# Patient Record
Sex: MALE | Race: WHITE | NOT HISPANIC OR LATINO | URBAN - METROPOLITAN AREA
[De-identification: names, ages, dates, MRNs, and addresses within clinical notes are randomized per-mention and may not be internally consistent; named-entity substitution may affect disease eponyms.]

---

## 2022-02-03 ENCOUNTER — EMERGENCY (EMERGENCY)
Facility: HOSPITAL | Age: 72
LOS: 1 days | Discharge: HOME | End: 2022-02-03
Admitting: EMERGENCY MEDICINE
Payer: MEDICARE

## 2022-02-03 VITALS
WEIGHT: 139.99 LBS | RESPIRATION RATE: 18 BRPM | TEMPERATURE: 98 F | OXYGEN SATURATION: 96 % | HEIGHT: 72 IN | SYSTOLIC BLOOD PRESSURE: 167 MMHG | HEART RATE: 92 BPM | DIASTOLIC BLOOD PRESSURE: 96 MMHG

## 2022-02-03 DIAGNOSIS — S90.425A BLISTER (NONTHERMAL), LEFT LESSER TOE(S), INITIAL ENCOUNTER: ICD-10-CM

## 2022-02-03 DIAGNOSIS — M79.675 PAIN IN LEFT TOE(S): ICD-10-CM
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
## 2022-02-03 DIAGNOSIS — M79.672 PAIN IN LEFT FOOT: ICD-10-CM
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
## 2022-02-03 PROCEDURE — 99282 EMERGENCY DEPT VISIT SF MDM: CPT

## 2022-02-03 NOTE — ED ADULT NURSE NOTE - OBJECTIVE STATEMENT
Pt presented to er with c/o b/l toes mild redness that started 1st week of January. Pt also endorses Rt 5th toe blister. Pt states saw a dermatologist on Tuesday, was prescribed a anti-fungal cream, have not used it yet. Here for second opinion. Of note, pt states that he wears thicker socks and walks 3 miles a day. Pt denies pain, swelling, no drainage. Pt denies any medical history. Pt A&Ox4, ambulatory with steady gait, speaking in clear/full sentences, no acute distress, vital signs stable.

## 2022-02-03 NOTE — ED PROVIDER NOTE - NSFOLLOWUPINSTRUCTIONS_ED_ALL_ED_FT
Please apply neosporin or bacitracin to small foot blister      Use foot cream as reccomended by derm    follow up with podiatry within two weeks    Return to the ed for any worsening or alamring symptoms                Nail Fungus    WHAT YOU NEED TO KNOW:    What is nail fungus? Nail fungus, or onychomycosis, is a fungal infection in your toenail or fingernail. Nail fungus is more common in toenails than fingernails. The cause of the infection may not be known.    What increases my risk for nail fungus?   •Athlete's foot      •Age 60 years or older      •Conditions such as diabetes, circulation problems, or a weak immune system      •Wearing heavy work boots that make your feet warm and sweaty      •Walking barefoot in locker rooms or public showers      •Working in a job where your hands are often wet (dishwashers, house )      •An unhealthy nail or underlying nail deformity      What are the signs and symptoms of nail fungus?   •Nails that curl up or down or are misshapen      •Discolored (often white, yellow, or brown) nails      •Fragile or cracked nails      •Thick nails or nails with rough, jagged edges      •Nail that is  from the nail bed      •Tenderness or pain in the affected nail      How is nail fungus diagnosed? Your healthcare provider can usually diagnose nail fungus by examining your nails. A small nail clipping may be examined or sent to a lab to test for infection.    How is nail fungus treated? Antifungal medicine is a pill that treats a fungal infection. You may need to take this medicine for up to 12 weeks. Topical treatments include creams and polishes that you apply to the top of your nail. You may need to use topical treatments for up to 1 year before you see positive results. Ask your healthcare provider for more information about antifungal medicine.    How can I manage my symptoms?   •Use antifungal sprays or powders. You can buy these at your local drugstore.       •Keep your nails short and file down any thick areas. Use separate nail trimmers and files for infected nails and healthy nails. If you go to a salon to get your nails done, bring your own nail files and trimmers.      How can I help prevent nail fungus?   •Dry your feet with a towel or hair dryer after you bathe.      •Do not wear tight-fitting shoes or shoes that pinch your toes. Avoid shoes made from rubber or plastic.      •Wear socks that absorb moisture. This includes socks make of wool, nylon, or polypropylene. Do not wear cotton socks. Change your socks if they are damp from sweat or your feet get wet. Put on dry, clean socks every day.       •Do not go barefoot in locker rooms or public showers.      •Do not use nail polish or artificial nails such as acrylic or gel nails.       •Wear gloves that are waterproof if you work with water.       When should I contact my healthcare provider? You have questions or concerns about your condition or care.    CARE AGREEMENT:    You have the right to help plan your care. Learn about your health condition and how it may be treated. Discuss treatment options with your healthcare providers to decide what care you want to receive. You always have the right to refuse treatment.

## 2022-02-03 NOTE — ED PROVIDER NOTE - PATIENT PORTAL LINK FT
You can access the FollowMyHealth Patient Portal offered by [facility] by registering at the following website: [email or web address]. By joining [facility]’s FollowMyHealth portal, you will also be able to view your health information using other applications (apps) compatible with our system.

## 2022-02-03 NOTE — ED PROVIDER NOTE - CLINICAL SUMMARY MEDICAL DECISION MAKING FREE TEXT BOX
Pt with irritation from tight shoes , his derm thought the same    no signs of cellulitus    will reccoemend bacitracin for small foot blister    podiatry follow up

## 2022-02-03 NOTE — ED PROVIDER NOTE - SKIN, MLM
No warmth noted to b/l toes, mild erythema noted to both other toes, likely due to tight shoes. No signs of trench foot noted. No open blisters. Pulses intact b/l.

## 2022-02-03 NOTE — ED PROVIDER NOTE - OBJECTIVE STATEMENT
72 y/o M with PMHx of gastritis, no other PMHx, presents to the ED with b/l discomfort to toes s/p walking in rain with tight shoes 3 weeks ago. Patient still notices slight redness to outside of both toes and small blister to L toe. No fever, chills, nausea, vomiting. Patient has had chronic nail fungus and saw derm recently who rx'd antifungal creme, wanted to double check that he was ok so presents to the ED.

## 2022-02-03 NOTE — ED ADULT TRIAGE NOTE - CHIEF COMPLAINT QUOTE
Pt presents to ED c/o toe pain. States "for one month I have this irritation on all of my toes on both feet. The skin is red and I have a blister on my right 5th toe". Denies itching, pain, fever, drainage.